# Patient Record
Sex: MALE | Race: WHITE | NOT HISPANIC OR LATINO | Employment: FULL TIME | URBAN - NONMETROPOLITAN AREA
[De-identification: names, ages, dates, MRNs, and addresses within clinical notes are randomized per-mention and may not be internally consistent; named-entity substitution may affect disease eponyms.]

---

## 2017-01-03 ENCOUNTER — TELEPHONE (OUTPATIENT)
Dept: FAMILY MEDICINE CLINIC | Facility: CLINIC | Age: 62
End: 2017-01-03

## 2017-01-03 NOTE — TELEPHONE ENCOUNTER
----- Message from Miguel Angel Franklin MD sent at 1/3/2017  8:18 AM EST -----  Call.  Tell Mr. West that I just got back from vacation.  Have been reviewing records.  We are calling regarding his toe.  I do feel that he should consult either podiatrist or an  orthopedic surgeon.  Please let me know what he wishes to do.

## 2017-01-04 ENCOUNTER — TELEPHONE (OUTPATIENT)
Dept: FAMILY MEDICINE CLINIC | Facility: CLINIC | Age: 62
End: 2017-01-04

## 2017-01-04 RX ORDER — LISINOPRIL AND HYDROCHLOROTHIAZIDE 25; 20 MG/1; MG/1
1 TABLET ORAL DAILY
Qty: 90 TABLET | Refills: 3 | Status: SHIPPED | OUTPATIENT
Start: 2017-01-04

## 2017-01-04 NOTE — TELEPHONE ENCOUNTER
He says that he was off during montserrat for 14 days and his toe has actually almost healed. He wants to wait and see how it does and will call back to have one of the referrals to the podiatrist or ortho. He also said that he needs a refill on his lisinopril. He want a 3 month supply sent into [facility].